# Patient Record
Sex: FEMALE | Race: WHITE | NOT HISPANIC OR LATINO
[De-identification: names, ages, dates, MRNs, and addresses within clinical notes are randomized per-mention and may not be internally consistent; named-entity substitution may affect disease eponyms.]

---

## 2020-11-20 ENCOUNTER — TRANSCRIPTION ENCOUNTER (OUTPATIENT)
Age: 50
End: 2020-11-20

## 2020-11-21 ENCOUNTER — OUTPATIENT (OUTPATIENT)
Dept: OUTPATIENT SERVICES | Facility: HOSPITAL | Age: 50
LOS: 1 days | Discharge: HOME | End: 2020-11-21
Payer: COMMERCIAL

## 2020-11-21 ENCOUNTER — RESULT REVIEW (OUTPATIENT)
Age: 50
End: 2020-11-21

## 2020-11-21 VITALS
HEIGHT: 64 IN | WEIGHT: 143.3 LBS | TEMPERATURE: 99 F | OXYGEN SATURATION: 96 % | DIASTOLIC BLOOD PRESSURE: 70 MMHG | SYSTOLIC BLOOD PRESSURE: 123 MMHG | HEART RATE: 77 BPM | RESPIRATION RATE: 16 BRPM

## 2020-11-21 VITALS
HEART RATE: 70 BPM | OXYGEN SATURATION: 97 % | RESPIRATION RATE: 16 BRPM | DIASTOLIC BLOOD PRESSURE: 60 MMHG | SYSTOLIC BLOOD PRESSURE: 110 MMHG

## 2020-11-21 DIAGNOSIS — Z98.890 OTHER SPECIFIED POSTPROCEDURAL STATES: Chronic | ICD-10-CM

## 2020-11-21 PROCEDURE — 88305 TISSUE EXAM BY PATHOLOGIST: CPT

## 2020-11-21 PROCEDURE — 88305 TISSUE EXAM BY PATHOLOGIST: CPT | Mod: 26

## 2020-11-21 PROCEDURE — 86850 RBC ANTIBODY SCREEN: CPT

## 2020-11-21 PROCEDURE — 86901 BLOOD TYPING SEROLOGIC RH(D): CPT

## 2020-11-21 PROCEDURE — 86900 BLOOD TYPING SEROLOGIC ABO: CPT

## 2020-11-21 PROCEDURE — 58558 HYSTEROSCOPY BIOPSY: CPT

## 2020-11-21 RX ORDER — ACETAMINOPHEN 500 MG
1000 TABLET ORAL ONCE
Refills: 0 | Status: DISCONTINUED | OUTPATIENT
Start: 2020-11-21 | End: 2020-11-21

## 2020-11-21 RX ORDER — ONDANSETRON 8 MG/1
8 TABLET, FILM COATED ORAL ONCE
Refills: 0 | Status: DISCONTINUED | OUTPATIENT
Start: 2020-11-21 | End: 2020-11-21

## 2020-11-21 RX ORDER — SIMETHICONE 80 MG/1
80 TABLET, CHEWABLE ORAL ONCE
Refills: 0 | Status: DISCONTINUED | OUTPATIENT
Start: 2020-11-21 | End: 2020-11-21

## 2020-11-21 RX ORDER — KETOROLAC TROMETHAMINE 30 MG/ML
15 SYRINGE (ML) INJECTION ONCE
Refills: 0 | Status: DISCONTINUED | OUTPATIENT
Start: 2020-11-21 | End: 2020-11-21

## 2020-11-21 RX ORDER — HYDROMORPHONE HYDROCHLORIDE 2 MG/ML
0.2 INJECTION INTRAMUSCULAR; INTRAVENOUS; SUBCUTANEOUS ONCE
Refills: 0 | Status: DISCONTINUED | OUTPATIENT
Start: 2020-11-21 | End: 2020-11-21

## 2020-11-21 RX ORDER — SODIUM CHLORIDE 9 MG/ML
1000 INJECTION, SOLUTION INTRAVENOUS
Refills: 0 | Status: DISCONTINUED | OUTPATIENT
Start: 2020-11-21 | End: 2020-11-21

## 2020-11-21 NOTE — ASU PATIENT PROFILE, ADULT - PMH
Genital herpes simplex, unspecified site    GERD (gastroesophageal reflux disease)    History of IBS    HPV in female    Hyperlipidemia, unspecified hyperlipidemia type    Hypothyroid    Migraines

## 2020-11-21 NOTE — PACU DISCHARGE NOTE - COMMENTS
discharged instructions provided, discharged via wheelchair to the lobby, escorted home by sister.
aching

## 2020-11-25 LAB — SURGICAL PATHOLOGY STUDY: SIGNIFICANT CHANGE UP

## 2021-10-22 PROBLEM — B97.7 PAPILLOMAVIRUS AS THE CAUSE OF DISEASES CLASSIFIED ELSEWHERE: Chronic | Status: ACTIVE | Noted: 2020-11-20

## 2021-10-22 PROBLEM — A60.00 HERPESVIRAL INFECTION OF UROGENITAL SYSTEM, UNSPECIFIED: Chronic | Status: ACTIVE | Noted: 2020-11-20

## 2021-10-22 PROBLEM — G43.909 MIGRAINE, UNSPECIFIED, NOT INTRACTABLE, WITHOUT STATUS MIGRAINOSUS: Chronic | Status: ACTIVE | Noted: 2020-11-20

## 2021-10-22 PROBLEM — K21.9 GASTRO-ESOPHAGEAL REFLUX DISEASE WITHOUT ESOPHAGITIS: Chronic | Status: ACTIVE | Noted: 2020-11-20

## 2021-10-22 PROBLEM — E78.5 HYPERLIPIDEMIA, UNSPECIFIED: Chronic | Status: ACTIVE | Noted: 2020-11-20

## 2021-10-22 PROBLEM — Z87.19 PERSONAL HISTORY OF OTHER DISEASES OF THE DIGESTIVE SYSTEM: Chronic | Status: ACTIVE | Noted: 2020-11-20

## 2021-10-22 PROBLEM — E03.9 HYPOTHYROIDISM, UNSPECIFIED: Chronic | Status: ACTIVE | Noted: 2020-11-20

## 2021-10-25 PROBLEM — Z00.00 ENCOUNTER FOR PREVENTIVE HEALTH EXAMINATION: Status: ACTIVE | Noted: 2021-10-25

## 2021-10-26 ENCOUNTER — TRANSCRIPTION ENCOUNTER (OUTPATIENT)
Age: 51
End: 2021-10-26

## 2021-10-26 ENCOUNTER — APPOINTMENT (OUTPATIENT)
Dept: UROLOGY | Facility: CLINIC | Age: 51
End: 2021-10-26
Payer: COMMERCIAL

## 2021-10-26 PROCEDURE — 99204 OFFICE O/P NEW MOD 45 MIN: CPT

## 2021-10-26 NOTE — HISTORY OF PRESENT ILLNESS
[FreeTextEntry1] : Language: English\par Date of First visit: 10/26/2021\par Accompanied by: Self\par Contact info: [phone]\par Referring Provider/PCP: Dr. Sarbjit Rehman\par Fax: [phone]\par \par Gynecologist : Nickie Dai\par [address]\par Fax: [phone]\par \par Cardiologist Dr. Kilgore\par \par \par CC/ Problem List:\par \par ===============================================================================\par FIRST VISIT:\par The patient is a 51 year female who first presents 10/26/2021 for UTI and urinary frequency.\par \par She had a UTI in the beginning of 2021. She thinks it may have been related to riding her Peloton. Her UTI treated with Macrobid. She was on this for 5 days. She got her period and then she had intercourse one week later. She had a UTI the next day and was treated with Macrobid for a day but a day later she then had fever (to 102) and UTI symptoms and back pain and pelvic pain. She was treated with Keflex by Urgent care. She is now on Keflex.\par \par She states today 10/26/2021 that she feels fine.\par \par She has never had a UTI before. She thinks she is perimenopausal. Her gyn felt she had vaginal dryness. She was put on Estrace cream around 10/19/2021. She gets frequent yeast infections: these are lifelong. She does not normally have constipation. She denies problems urinating normally. She is drinking a lot of water. She takes showers not baths. She does not swim regularly. She does not douche. She denies h/o  trauma or surgery. She has no h/o kidney stones. She has no h/o pediatric voiding dysfunction or UTI's\par \par -------------------------------------------------------------------------------------------\par INTERVAL VISITS:\par \par \par ===============================================================================\par \par PMH: Hypothyroid, HLD, small heart murmur, ? heart blockage, uterine polyp\par PSH: Polyp removal (uterine)\par POBH: (if applicable) \par FH: Father ALS\par \par ALL: Nutrasweet, Aspartame, Biaxin (n/v)\par MEDS: Keflex, Crestor, Synthroid, Florastor, Diflucan PRN, Vit D, Vit B12, Vit C, Zinc\par SOC:  Denies Tob, Social EtOH, Denies drugs\par \par \par ROS: Review of Systems is as per HPI unless otherwise denoted below\par \par \par ===============================================================================\par DATA: \par \par LABS:-------------------------------------------------------------------------------------------------------------------\par 10/26/2021: UA dip Trace BLD, Trace LE\par \par \par RADS:-------------------------------------------------------------------------------------------------------------------\par \par \par \par PATHOLOGY/CYTOLOGY:-------------------------------------------------------------------------------------------\par \par \par \par VOIDING STUDIES: ----------------------------------------------------------------------------------------------------\par 10/26/2021: PVR 2cc\par \par \par STONE STUDIES: (Analysis/LLSA)----------------------------------------------------------------------------------\par \par \par \par PROCEDURES: -----------------------------------------------------------------------------------------------\par \par \par \par \par ===============================================================================\par \par PHYSICAL EXAM:\par \par GEN: AAOx3, NAD, Habitus: normal\par \par BARRIERS to CARE: none\par \par PSYCH: Appropriate Behavior, Affect Congruent\par \par HEENT: AT/NC Trachea midline. EOMI.\par \par Lungs: No labored breathing.\par \par NEURO: + Movement, all 4 extremities grossly intact without deficits. No tremors.\par \par SKIN: Warm dry. No visible rashes or ulcers\par \par GAIT: Gait normal, Stability good\par \par =======================================================================================\par

## 2021-10-26 NOTE — ASSESSMENT
[FreeTextEntry1] : \par =======================================================================================\par ASSESSMENT and PLAN\par \par The patient is a 51 year female with a history of the following:\par \par 1. UTI that progressed into pyelonephritis:\par \par Greater than 50% of this 45 minute visit was spent discussing the causes, diagnosis, prevention and treatment of sporadic and recurrent UTI's in women. She understands that they often come from the patient's own vagina (especially when UTI's are post-coital) or own rectum (especially if the patient is fecally incontinent, constipated or has anal intercourse). \par \par The patient is adam-menopausal.\par The patient's UTI's can be related to intercourse.\par \par The patient and I discussed standard hygiene techniques for prevention. These include wiping front to back after having bowel movements. Voiding after intercourse remains controversial in its ability to help, but I recommend it. Patients who are constipated are at increased risk of UTI's.  We do not recommend the use of douches. Swimming, spinning (bicycling), intercourse, staying in wet bathing suits or wet pads can also trigger UTI's in certain patients. In premenopausal women, UTI's may be more common during certain times of the menstrual cycle. In addition the use of certain forms of birth control (e.g. condoms,  OCP, diaphragms, IUDs) or intravaginal appliances (e.g. pessaries) may also cause UTI's though this varies GREATLY from patient to patient.\par \par In 2019, the AUA issued their first guidelines for the management of recurrent UTI's in women however there is still a paucity of options.\par The patient understands that urine cultures are critical for the diagnosis and tracking of UTI's, especially with evolving and increasing antibiotic resistance. We do NOT treat asymptomatic bacteriuria and we do NOT test for cure. If she has symptoms of a UTI, she should contact our office for a urine culture order, otherwise, I recommend on weekends seeing an Urgent Care Center and requesting that a culture be sent to me. \par \par I try to avoid using antibiotic suppression as this causes resistance and can cause other adverse reactions. We also discussed the damage that antibiotics do to both the bowel and vaginal collin, and how this damage can cause diarrhea and vaginal yeast infections. This in turn can cause a cycle of UTI's.\par \par Unfortunately in many ways, we have not made many advances in the treatment of recurrent UTI's in women and our treatment/prevention options are limited.\par \par Today I have recommended the following:\par \par \par Prevention:\par -- Vaginal health probiotic orally: these can be found easily at pharmacy. I do not recommend for UTI's in particular is Florastor (Saccharomyces boulardii)\par -- Intravaginal vaginal probiotic. I recommended using a product like Florafemme once a week at bedtime or twice a week if on antibiotics\par \par --  I recommended that the patient stay well hydrated. Increased water intake in some studies can help prevent UTI's.\par --  Estrace or Premarin cream 1gm per vagina twice a week at bedtime. Please use this for three weeks then take one week off. Then begin the cycle again.\par \par She may want to do a UA C&S 1-2 weeks after being off of abx for her own anxiety levels. She realizes we do NOT normally test for cure. We explained the pathophysiology of UTI's at length as well as the causes and risk factors. She knows that anecdotally somethings like riding a bike and swimming can also increase risk. She was excited to try the above things. I told her to stay on Florastor while on Abx but I don't normally recommend Florastor when she is not on abx. \par \par We will do a phone visit in 6 mos.\par \par \par -----------------------------------------------------------------------------------------------------\par LABS/TESTS Ordered: \[facility] Meds Ordered: Florafemme, continue Estrace\[facility] Follow up: Phone visit 6 mos\par -----------------------------------------------------------------------------------------------------\par \par Greater than 50% of this 50 minute visit was spent counseling the patient and coordinating care.\par \par Thank you for allowing me to assist in the care of your patient. Should you have any questions please do not hesitate to reach out to me.\par \par \par Dinora Griffith MD\[facility] Associate \[facility] Department of Urology\[facility] Phone: [phone]\[facility] Fax: [phone]\[facility] \[facility] [address] [place]\[facility] [address]\[facility]

## 2022-01-28 ENCOUNTER — TRANSCRIPTION ENCOUNTER (OUTPATIENT)
Age: 52
End: 2022-01-28

## 2022-01-28 VITALS
SYSTOLIC BLOOD PRESSURE: 104 MMHG | OXYGEN SATURATION: 97 % | WEIGHT: 147.05 LBS | RESPIRATION RATE: 16 BRPM | TEMPERATURE: 99 F | HEART RATE: 70 BPM | DIASTOLIC BLOOD PRESSURE: 67 MMHG | HEIGHT: 64 IN

## 2022-01-28 RX ORDER — ESOMEPRAZOLE MAGNESIUM 40 MG/1
1 CAPSULE, DELAYED RELEASE ORAL
Qty: 0 | Refills: 0 | DISCHARGE

## 2022-01-28 NOTE — ASU PATIENT PROFILE, ADULT - NSICDXPASTMEDICALHX_GEN_ALL_CORE_FT
PAST MEDICAL HISTORY:  Genital herpes simplex, unspecified site     GERD (gastroesophageal reflux disease)     History of IBS     HPV in female     Hyperlipidemia, unspecified hyperlipidemia type     Hypothyroid     Hypothyroidism     Migraines

## 2022-01-28 NOTE — ASU PREOP CHECKLIST - SELECT TESTS ORDERED
BMP/CBC/CMP/PT/PTT/INR/Urinalysis/EKG/COVID-19 Andrea TRACY: Spoke to surgical resident- aware that Dr. Tovar's patient is in ED and being evaluated at this time. Andrea TRACY: Patient pending ultrasound, CT at this time; offered analgesics; defers at this time; will continue to re-eval. Andrea TRACY: CT, US performed; Dr. Tovar called and requests admission to his service with plan for cholecystectomy today; patient found to have uti- antibiotics ordered; patient made aware of plan.

## 2022-01-29 ENCOUNTER — OUTPATIENT (OUTPATIENT)
Dept: INPATIENT UNIT | Facility: HOSPITAL | Age: 52
LOS: 1 days | End: 2022-01-29
Payer: COMMERCIAL

## 2022-01-29 ENCOUNTER — TRANSCRIPTION ENCOUNTER (OUTPATIENT)
Age: 52
End: 2022-01-29

## 2022-01-29 ENCOUNTER — RESULT REVIEW (OUTPATIENT)
Age: 52
End: 2022-01-29

## 2022-01-29 VITALS
OXYGEN SATURATION: 98 % | HEART RATE: 72 BPM | SYSTOLIC BLOOD PRESSURE: 124 MMHG | RESPIRATION RATE: 16 BRPM | DIASTOLIC BLOOD PRESSURE: 56 MMHG

## 2022-01-29 DIAGNOSIS — Z98.890 OTHER SPECIFIED POSTPROCEDURAL STATES: Chronic | ICD-10-CM

## 2022-01-29 LAB
BLD GP AB SCN SERPL QL: NEGATIVE — SIGNIFICANT CHANGE UP
RH IG SCN BLD-IMP: POSITIVE — SIGNIFICANT CHANGE UP

## 2022-01-29 PROCEDURE — 88305 TISSUE EXAM BY PATHOLOGIST: CPT | Mod: 26

## 2022-01-29 PROCEDURE — 58558 HYSTEROSCOPY BIOPSY: CPT

## 2022-01-29 PROCEDURE — 86900 BLOOD TYPING SEROLOGIC ABO: CPT

## 2022-01-29 PROCEDURE — 86850 RBC ANTIBODY SCREEN: CPT

## 2022-01-29 PROCEDURE — 86901 BLOOD TYPING SEROLOGIC RH(D): CPT

## 2022-01-29 PROCEDURE — 88305 TISSUE EXAM BY PATHOLOGIST: CPT

## 2022-01-29 DEVICE — MYOSURE TISSUE REMOVAL DEVICE REACH: Type: IMPLANTABLE DEVICE | Status: FUNCTIONAL

## 2022-01-29 RX ORDER — METOCLOPRAMIDE HCL 10 MG
10 TABLET ORAL EVERY 8 HOURS
Refills: 0 | Status: DISCONTINUED | OUTPATIENT
Start: 2022-01-29 | End: 2022-01-29

## 2022-01-29 RX ORDER — OXYCODONE HYDROCHLORIDE 5 MG/1
10 TABLET ORAL EVERY 4 HOURS
Refills: 0 | Status: DISCONTINUED | OUTPATIENT
Start: 2022-01-29 | End: 2022-01-29

## 2022-01-29 RX ORDER — ROSUVASTATIN CALCIUM 5 MG/1
1 TABLET ORAL
Qty: 0 | Refills: 0 | DISCHARGE

## 2022-01-29 RX ORDER — SODIUM CHLORIDE 9 MG/ML
1000 INJECTION, SOLUTION INTRAVENOUS
Refills: 0 | Status: DISCONTINUED | OUTPATIENT
Start: 2022-01-29 | End: 2022-01-29

## 2022-01-29 RX ORDER — ACETAMINOPHEN 500 MG
1000 TABLET ORAL EVERY 6 HOURS
Refills: 0 | Status: DISCONTINUED | OUTPATIENT
Start: 2022-01-29 | End: 2022-01-29

## 2022-01-29 RX ORDER — SIMETHICONE 80 MG/1
80 TABLET, CHEWABLE ORAL EVERY 6 HOURS
Refills: 0 | Status: DISCONTINUED | OUTPATIENT
Start: 2022-01-29 | End: 2022-01-29

## 2022-01-29 RX ORDER — LEVOTHYROXINE SODIUM 125 MCG
1 TABLET ORAL
Qty: 0 | Refills: 0 | DISCHARGE

## 2022-01-29 RX ORDER — ONDANSETRON 8 MG/1
8 TABLET, FILM COATED ORAL EVERY 8 HOURS
Refills: 0 | Status: DISCONTINUED | OUTPATIENT
Start: 2022-01-29 | End: 2022-01-29

## 2022-01-29 RX ORDER — KETOROLAC TROMETHAMINE 30 MG/ML
30 SYRINGE (ML) INJECTION EVERY 8 HOURS
Refills: 0 | Status: DISCONTINUED | OUTPATIENT
Start: 2022-01-29 | End: 2022-01-29

## 2022-01-29 RX ORDER — NORETHINDRONE AND ETHINYL ESTRADIOL 0.4-0.035
1 KIT ORAL
Qty: 0 | Refills: 0 | DISCHARGE

## 2022-01-29 RX ORDER — OXYCODONE HYDROCHLORIDE 5 MG/1
5 TABLET ORAL EVERY 4 HOURS
Refills: 0 | Status: DISCONTINUED | OUTPATIENT
Start: 2022-01-29 | End: 2022-01-29

## 2022-01-29 RX ORDER — HYDROMORPHONE HYDROCHLORIDE 2 MG/ML
0.2 INJECTION INTRAMUSCULAR; INTRAVENOUS; SUBCUTANEOUS
Refills: 0 | Status: DISCONTINUED | OUTPATIENT
Start: 2022-01-29 | End: 2022-01-29

## 2022-01-29 NOTE — ASU DISCHARGE PLAN (ADULT/PEDIATRIC) - CALL YOUR DOCTOR IF YOU HAVE ANY OF THE FOLLOWING:
100.4/Bleeding that does not stop/Pain not relieved by Medications/Fever greater than (need to indicate Fahrenheit or Celsius)

## 2022-01-29 NOTE — ASU DISCHARGE PLAN (ADULT/PEDIATRIC) - NS MD DC FALL RISK RISK
For information on Fall & Injury Prevention, visit: [email or web address] OR  [email or web address] OR  https://www.cdc.gov/steadi/patient.html

## 2022-01-29 NOTE — DISCHARGE NOTE NURSING/CASE MANAGEMENT/SOCIAL WORK - PATIENT PORTAL LINK FT
You can access the FollowMyHealth Patient Portal offered by [facility] by registering at the following website: [email or web address]. By joining [facility]’s FollowMyHealth portal, you will also be able to view your health information using other applications (apps) compatible with our system.

## 2022-01-29 NOTE — ASU DISCHARGE PLAN (ADULT/PEDIATRIC) - CARE PROVIDER_API CALL
Hai Clark)  Obstetrics and Gynecology  [address], [address]  [place], [place] [address]  Phone: [phone]  Fax: [phone]  Follow Up Time:

## 2022-01-29 NOTE — DISCHARGE NOTE NURSING/CASE MANAGEMENT/SOCIAL WORK - NSDCPEFALRISK_GEN_ALL_CORE
For information on Fall & Injury Prevention, visit: [email or web address] OR  [email or web address] OR  https://www.cdc.gov/steadi/patient.html

## 2022-01-29 NOTE — PACU DISCHARGE NOTE - COMMENTS
discharge instruction reviewed in-full with pt, good understanding verbalized pt met criteria cor d/c home condition stable.

## 2022-02-01 LAB — SURGICAL PATHOLOGY STUDY: SIGNIFICANT CHANGE UP

## 2022-04-12 ENCOUNTER — APPOINTMENT (OUTPATIENT)
Dept: UROLOGY | Facility: CLINIC | Age: 52
End: 2022-04-12
Payer: COMMERCIAL

## 2022-04-12 PROCEDURE — 99442: CPT

## 2022-06-22 PROBLEM — E03.9 HYPOTHYROIDISM, UNSPECIFIED: Chronic | Status: ACTIVE | Noted: 2022-01-28

## 2022-06-23 ENCOUNTER — APPOINTMENT (OUTPATIENT)
Dept: UROLOGY | Facility: CLINIC | Age: 52
End: 2022-06-23
Payer: COMMERCIAL

## 2022-06-23 VITALS
SYSTOLIC BLOOD PRESSURE: 126 MMHG | WEIGHT: 145 LBS | OXYGEN SATURATION: 97 % | BODY MASS INDEX: 24.45 KG/M2 | TEMPERATURE: 97.2 F | HEIGHT: 64.5 IN | DIASTOLIC BLOOD PRESSURE: 84 MMHG | HEART RATE: 79 BPM

## 2022-06-23 DIAGNOSIS — R31.29 OTHER MICROSCOPIC HEMATURIA: ICD-10-CM

## 2022-06-23 DIAGNOSIS — M54.9 DORSALGIA, UNSPECIFIED: ICD-10-CM

## 2022-06-23 PROCEDURE — 81003 URINALYSIS AUTO W/O SCOPE: CPT | Mod: QW

## 2022-06-23 PROCEDURE — 99214 OFFICE O/P EST MOD 30 MIN: CPT

## 2022-06-24 LAB
APPEARANCE: CLEAR
BACTERIA: NEGATIVE
BILIRUB UR QL STRIP: NEGATIVE
BILIRUBIN URINE: NEGATIVE
BLOOD URINE: NORMAL
COLLECTION METHOD: NORMAL
COLOR: YELLOW
GLUCOSE QUALITATIVE U: NEGATIVE
GLUCOSE UR-MCNC: NEGATIVE
HCG UR QL: 0.2 EU/DL
HGB UR QL STRIP.AUTO: NORMAL
HYALINE CASTS: 1 /LPF
KETONES UR-MCNC: NEGATIVE
KETONES URINE: NEGATIVE
LEUKOCYTE ESTERASE UR QL STRIP: NEGATIVE
LEUKOCYTE ESTERASE URINE: NEGATIVE
MICROSCOPIC-UA: NORMAL
NITRITE UR QL STRIP: NEGATIVE
NITRITE URINE: NEGATIVE
PH UR STRIP: 6
PH URINE: 6
PROT UR STRIP-MCNC: NEGATIVE
PROTEIN URINE: NEGATIVE
RED BLOOD CELLS URINE: 3 /HPF
SP GR UR STRIP: 1.02
SPECIFIC GRAVITY URINE: 1.02
SQUAMOUS EPITHELIAL CELLS: 1 /HPF
UROBILINOGEN URINE: NORMAL
WHITE BLOOD CELLS URINE: 1 /HPF

## 2022-06-27 ENCOUNTER — APPOINTMENT (OUTPATIENT)
Dept: UROLOGY | Facility: CLINIC | Age: 52
End: 2022-06-27
Payer: COMMERCIAL

## 2022-06-27 LAB — BACTERIA UR CULT: NORMAL

## 2022-06-27 PROCEDURE — 99443: CPT

## 2022-06-27 NOTE — HISTORY OF PRESENT ILLNESS
[FreeTextEntry1] : Language: English\par Date of First visit: 10/26/2021\par Accompanied by: Self\par Contact info: [phone]\par Referring Provider/PCP: Dr. Sarbjit Rehman\par Fax: [phone]\par \par Gynecologist : Nickie Dai\par [address]\par Fax: [phone]\par \par Cardiologist Dr. Kilgore\par \par \par CC/ Problem List:\par \par ===============================================================================\par FIRST VISIT:\par The patient was a 51 year female who first presented on 10/26/2021 for UTI and urinary frequency.\par \par She had a UTI in the beginning of 2021. She thinks it may have been related to riding her Peloton. Her UTI treated with Macrobid. She was on this for 5 days. She got her period and then she had intercourse one week later. She had a UTI the next day and was treated with Macrobid for a day but a day later she then had fever (to 102) and UTI symptoms and back pain and pelvic pain. She was treated with Keflex by Urgent care. She is now on Keflex.\par \par She has never had a UTI before. She thinks she is perimenopausal. Her gyn felt she had vaginal dryness. She was put on Estrace cream around 10/19/2021. She gets frequent yeast infections: these are lifelong. She does not normally have constipation. She denies problems urinating normally. She is drinking a lot of water. She takes showers not baths. She does not swim regularly. She does not douche. She denies h/o  trauma or surgery. She has no h/o kidney stones. She has no h/o pediatric voiding dysfunction or UTI's\[facility] [place] \par -------------------------------------------------------------------------------------------\par INTERVAL VISITS:\par \par She is not taking the Estrace because she had a "Growth". She was put on Junel to help prevent these. She is using the Florafemme and has not had a UTI since her visit in Oct 2021.\par \par The patient's age today 2022 is 51 year old.\par Please note interval events and changes in PMH, PSH, MEDS and ALLERGIES were reviewed.\par She has BL SI joint pain R>L. Her cardiologist told her her urine was not right. She has no fever. She denies dysuria. She has mild urgency. She has a little more frequency but she ahs been drinking "water like a monster". She is extremely anxious about these results. she is still using Florafemme. Her dog has ketones and protein in her urine and has liver and kidney disease which makes her nervous.\par \par ===============================================================================\par \par PMH: Hypothyroid, HLD, small heart murmur, ? heart blockage, uterine polyp\par PSH: Polyp removal (uterine)\par POBH: (if applicable) \par FH: Father ALS\par \par ALL: Nutrasweet, Aspartame, Biaxin (n/v)\par MEDS: Keflex, Crestor, Synthroid, Florastor, Diflucan PRN, Vit D, Vit B12, Vit C, Zinc, Junel, Florafemme\par SOC:  Denies Tob, Social EtOH, Denies drugs\par \par \par ROS: Review of Systems is as per HPI unless otherwise denoted below\par \par \par ===============================================================================\par DATA: \par \par LABS:-------------------------------------------------------------------------------------------------------------------\par 10/26/2021: UA dip Trace BLD, Trace LE\par 2022: sCre 0.82, \par 2022: UA micro 1 WBC, Trace Ketones, Negative blood, Neg Nit, Trace LE, 13 RBC/hpf, 2 Epi, Few Bacteria\par 2022: UA dip small blood, Neg NIT, Neg LE\par \par \par RADS:-------------------------------------------------------------------------------------------------------------------\par \par \par \par PATHOLOGY/CYTOLOGY:-------------------------------------------------------------------------------------------\par \par \par \par VOIDING STUDIES: ----------------------------------------------------------------------------------------------------\par 10/26/2021: PVR 2cc\par \par \par STONE STUDIES: (Analysis/LLSA)----------------------------------------------------------------------------------\par \par \par \par PROCEDURES: -----------------------------------------------------------------------------------------------\par \par \par \par \par ===============================================================================\par \par PHYSICAL EXAM:\par \par GEN: AAOx3, NAD, Habitus: normal\par \par BARRIERS to CARE: none\par \par PSYCH: Appropriate Behavior, Affect Congruent\par \par HEENT: AT/NC Trachea midline. EOMI.\par \par Lungs: No labored breathing.\par \par NEURO: + Movement, all 4 extremities grossly intact without deficits. No tremors.\par \par SKIN: Warm dry. No visible rashes or ulcers\par \par GAIT: Gait normal, Stability good\par \par \par =======================================================================================\par \par ASSESSMENT and PLAN\par \par Today 2022 the patient is a 51 year old female with a history of the following:\par \par \par 1. UTI that progressed into pyelonephritis:\par \par Greater than 50% of this 45 minute visit was spent discussing the causes, diagnosis, prevention and treatment of sporadic and recurrent UTI's in women. She understands that they often come from the patient's own vagina (especially when UTI's are post-coital) or own rectum (especially if the patient is fecally incontinent, constipated or has anal intercourse). \par \par The patient is adam-menopausal.\par The patient's UTI's can be related to intercourse.\par \par She stopped the Estrace and is on Junel for her uterine polyps. \par She is having good success with Florafemme. She understands that prevention is critical to avoid antibiotic use and UTI's. \par \par \par \par 2. SI joint pain with largely negative urine: she is anxious that she has a UTI\par We will send a urine culture. If it is negative we will do a hematuria workup. If it is positive we will treat her.\par I tried at length to educate her about ketones, nit, LE, protein in the urine to allay her fears. \par \par She will go to the ER/Urgent care if she gets worse.\par \par -----------------------------------------------------------------------------------------------------\par LABS/TESTS Ordered: [facility] C&S\par Meds Ordered: [place]\[facility] [place] Follow up: Video visit on Monday\par -----------------------------------------------------------------------------------------------------\par \par Greater than 50% of this 30 minute visit was spent counseling the patient and coordinating care.\par \par Thank you for allowing me to assist in the care of your patient. Should you have any questions please do not hesitate to reach out to me.\par \par \par Dinora Griffith MD\par Associate \[facility] Department of Urology\[facility] Phone: [phone]\[facility] Fax: [phone]\[facility] \[facility] [address] [place] [address] [address]

## 2022-07-01 NOTE — HISTORY OF PRESENT ILLNESS
[FreeTextEntry1] : Language: English\par Date of First visit: 10/26/2021\par Accompanied by: Self\par Contact info: [phone]\par Referring Provider/PCP: Dr. Sarbjit Rehman\par Fax: [phone]\par \par Gynecologist : Nickie Dai\par [address]\par Fax: [phone]\par \par Cardiologist Dr. Kilgore\par \par \par CC/ Problem List:\par \par ===============================================================================\par FIRST VISIT:\par The patient was a 51 year female who first presented on 10/26/2021 for UTI and urinary frequency.\par \par She had a UTI in the beginning of 2021. She thinks it may have been related to riding her Peloton. Her UTI treated with Macrobid. She was on this for 5 days. She got her period and then she had intercourse one week later. She had a UTI the next day and was treated with Macrobid for a day but a day later she then had fever (to 102) and UTI symptoms and back pain and pelvic pain. She was treated with Keflex by Urgent care. She is now on Keflex.\par \par She has never had a UTI before. She thinks she is perimenopausal. Her gyn felt she had vaginal dryness. She was put on Estrace cream around 10/19/2021. She gets frequent yeast infections: these are lifelong. She does not normally have constipation. She denies problems urinating normally. She is drinking a lot of water. She takes showers not baths. She does not swim regularly. She does not douche. She denies h/o  trauma or surgery. She has no h/o kidney stones. She has no h/o pediatric voiding dysfunction or UTI's\[facility] \par -------------------------------------------------------------------------------------------\par INTERVAL VISITS:\par \par She is not taking the Estrace because she had a "Growth". She was put on Junel to help prevent these. She is using the Florafemme and has not had a UTI since her visit in Oct 2021.\par \par She re-presented in 2022 with BL SI joint pain R>L. Her cardiologist told her her urine was not right. She did not have fever or dysuria but did have slightly more frequency and urgency but was drinking a ton of water. She was very anxious. \par \par The patient's age today 2022 is 51 year old.\par Please note interval events and changes in PMH, PSH, MEDS and ALLERGIES were reviewed.\par we are reviewing her results. She feels the same but now feels more pelvic pain and pain in her side.\par \par ===============================================================================\par \par PMH: Hypothyroid, HLD, small heart murmur, ? heart blockage, uterine polyp\par PSH: Polyp removal (uterine)\par POBH: (if applicable) \par FH: Father ALS\par \par ALL: Nutrasweet, Aspartame, Biaxin (n/v)\par MEDS: Keflex, Crestor, Synthroid, Florastor, Diflucan PRN, Vit D, Vit B12, Vit C, Zinc, Junel, Florafemme\par SOC:  Denies Tob, Social EtOH, Denies drugs\par \par \par ROS: Review of Systems is as per HPI unless otherwise denoted below\par \par \par ===============================================================================\par DATA: \par \par LABS:-------------------------------------------------------------------------------------------------------------------\par 10/26/2021: UA dip Trace BLD, Trace LE\par 2022: sCre 0.82, \par 2022: UA micro 1 WBC, Trace Ketones, Negative blood, Neg Nit, Trace LE, 13 RBC/hpf, 2 Epi, Few Bacteria\par 2022: UA dip small blood, Neg NIT, Neg LE\par 2022: UA negative NIT, LE, 3 RBC,  1 Epi, 1 WBC; UCx negative\par \par RADS:-------------------------------------------------------------------------------------------------------------------\par \par \par \par PATHOLOGY/CYTOLOGY:-------------------------------------------------------------------------------------------\par \par \par \par VOIDING STUDIES: ----------------------------------------------------------------------------------------------------\par 10/26/2021: PVR 2cc\par \par \par STONE STUDIES: (Analysis/LLSA)----------------------------------------------------------------------------------\par \par \par \par PROCEDURES: -----------------------------------------------------------------------------------------------\par \par \par \par \par ===============================================================================\par \par PHYSICAL EXAM:\par \par GEN: AAOx3, NAD, Habitus: normal\par \par BARRIERS to CARE: none\par \par based on this video visit\par \par =======================================================================================\par \par ASSESSMENT and PLAN\par \par Today 2022 the patient is a 51 year old female with a history of the following:\par \par 1. UTI that progressed into pyelonephritis:\par She is using florafemme and has been doing well.\par \par \par 2. SI joint pain with largely negative urine: she is anxious that she has a UTI\par She did NOT have an infection but did have 3-13RBC/hpf\par \par ************************************** MICROHEMATURIA GUIDELINES**************************************************\par \par Hematuria RISK FACTORS-----------------------------------------------------------------------------------------------------\par Age, Gender, Smoking, Chronic foreign body/catheter, occupational exposure, \par Prior Cyclophosphamide or ifosfomide, prior RT, irritative voiding symptoms\par \par RISK STRATIFICATION---------------------------------------------------------------------------------------------------------\par \par -.-.-.-.-.-.-.-.-.-.-.Low Risk-.-.-.-.-.-.-.-.-.-.-.-.-.Mod Risk-.-.-.-.-.-.-.-.-.-.-.-.-.-.High Risk-.-.-.-.-.-.-.-.-.-.-.-.-.-.-.-.-.-.-\par (ALL OF THE BELOW)                    (ANY ONE OF THE BELOW)         (ANY OF THE BELOW or MORE)\par --Females <51yo                                   --Female 50-60yo                      --Females >59yo\par -- Males <41yo                                      --Males 40-60yo                        --Males >59yo\par 0-10 pack years                                   10-30 pack years                       >30 pack years\par 3-10 RBC/hpf                                         11-25 RBC/hpf                          >25 RBC/hpf\par No RISK FACTORS                               ANY RISK FACTORS                 \par                                                               Low risk w/ 3-10 RBC                 Gross hematuria\par                                                                     on repeat UA\par \par \par WORKUP-------------------------------------------------------------------------------------------------------------------------\par -.-.-.-.-.-.-.-.-.-.-.Low Risk-.-.-.-.-.-.-.-.-.-.-.-.-.Mod Risk-.-.-.-.-.-.-.-.-.-.-.-.-.-.High Risk-.-.-.-.-.-.-.-.-.-.-.-.-.-.-.-.-.-.-\par --Initial: Repeat UA 6 mos                           Cystoscopy                              Cystoscopy\par             OR                                                        AND                                         AND\par    Cysto, Renal sono                                   Renal Sono                                    CTU OR\par                                                                                                               2nd Choice: MRU\par                                                                                                               3rd Choice: RTGP w/ NCCT or Sono\par \par **In patients with microhematuria with a known FH of RCC or genetic renal tumor syndrome, \par upper tract imaging should be performed regardless of risk stratification\par \par \par -----------------------------------------------------------------------------------------------------\par LABS/TESTS Ordered: RBUS\par Meds Ordered: Florafemme\par Follow up: Cysto after RBUS\par -----------------------------------------------------------------------------------------------------\par \par This visit was held as a VIDEO visit on a HIPAA compliant video platform.\par \par Greater than 50% of this 20 minute visit was spent counseling the patient and coordinating care.\par \par Thank you for allowing me to assist in the care of your patient. Should you have any questions please do not hesitate to reach out to me.\par \par \par Dinora Griffith MD\par Associate \par Department of Urology\par [facility]\par Phone: [phone]\par Fax: [phone]\par \par [address]\par [facility] [address]

## 2022-07-07 ENCOUNTER — APPOINTMENT (OUTPATIENT)
Dept: UROLOGY | Facility: CLINIC | Age: 52
End: 2022-07-07

## 2022-07-07 VITALS
TEMPERATURE: 98.1 F | HEART RATE: 78 BPM | OXYGEN SATURATION: 97 % | SYSTOLIC BLOOD PRESSURE: 117 MMHG | DIASTOLIC BLOOD PRESSURE: 80 MMHG

## 2022-07-07 LAB
BILIRUB UR QL STRIP: NORMAL
CLARITY UR: CLEAR
COLLECTION METHOD: NORMAL
GLUCOSE UR-MCNC: NORMAL
HCG UR QL: 0.2 EU/DL
HGB UR QL STRIP.AUTO: NORMAL
KETONES UR-MCNC: NORMAL
LEUKOCYTE ESTERASE UR QL STRIP: NORMAL
NITRITE UR QL STRIP: NORMAL
PH UR STRIP: 5
PROT UR STRIP-MCNC: NORMAL
SP GR UR STRIP: >=1.03

## 2022-07-07 PROCEDURE — 81003 URINALYSIS AUTO W/O SCOPE: CPT | Mod: QW

## 2022-07-07 PROCEDURE — 52000 CYSTOURETHROSCOPY: CPT

## 2022-11-17 ENCOUNTER — APPOINTMENT (OUTPATIENT)
Dept: UROLOGY | Facility: CLINIC | Age: 52
End: 2022-11-17

## 2022-11-17 VITALS
DIASTOLIC BLOOD PRESSURE: 81 MMHG | OXYGEN SATURATION: 96 % | HEART RATE: 77 BPM | TEMPERATURE: 98.3 F | SYSTOLIC BLOOD PRESSURE: 125 MMHG

## 2022-11-17 PROCEDURE — 99213 OFFICE O/P EST LOW 20 MIN: CPT

## 2023-03-28 DIAGNOSIS — R10.2 PELVIC AND PERINEAL PAIN: ICD-10-CM

## 2023-03-30 LAB — BACTERIA UR CULT: NORMAL

## 2023-03-30 NOTE — HISTORY OF PRESENT ILLNESS
[FreeTextEntry1] : Language: English\par Date of First visit: 10/26/2021\par Accompanied by: Self\par Contact info: [phone]\par Referring Provider/PCP: Dr. Sarbjit Rehman\par Fax: [phone]\par \par Gynecologist : Nickie Dai\par [address]\par Fax: [phone]\par \par Cardiologist Dr. Kilgore\par \par \par CC/ Problem List:\par \par ===============================================================================\par FIRST VISIT:\par The patient was a 51 year female who first presented on 10/26/2021 for UTI and urinary frequency.\par \par She had a UTI in the beginning of 2021. She thinks it may have been related to riding her Peloton. Her UTI treated with Macrobid. She was on this for 5 days. She got her period and then she had intercourse one week later. She had a UTI the next day and was treated with Macrobid for a day but a day later she then had fever (to 102) and UTI symptoms and back pain and pelvic pain. She was treated with Keflex by Urgent care. She is now on Keflex.\par \par She has never had a UTI before. She thinks she is perimenopausal. Her gyn felt she had vaginal dryness. She was put on Estrace cream around 10/19/2021. She gets frequent yeast infections: these are lifelong. She does not normally have constipation. She denies problems urinating normally. She is drinking a lot of water. She takes showers not baths. She does not swim regularly. She does not douche. She denies h/o  trauma or surgery. She has no h/o kidney stones. She has no h/o pediatric voiding dysfunction or UTI's\[facility] \par -------------------------------------------------------------------------------------------\par INTERVAL VISITS:\par \par She is not taking the Estrace because she had a "Growth". She was put on Junel to help prevent these. She is using the Florafemme and has not had a UTI since her visit in Oct 2021.\par \par She re-presented in 2022 with BL SI joint pain R>L. Her cardiologist told her her urine was not right. She did not have fever or dysuria but did have slightly more frequency and urgency but was drinking a ton of water. She was very anxious. She was convinced she had a UTI even though her urine was largely normal and positive only for hematuria. (Her culture was negative)\par \par Because she had microhematuria (int risk) we had her do a RBUS and on 2022 we did a cystoscopy that showed mild trigonitis.\par \par The patient's age today 2022 is 52 year old.\par Please note interval events and changes in PMH, PSH, MEDS and ALLERGIES were reviewed.\par She is super worried because she had trace protein (20) and hyaline casts on her urine. Her dog  of renal failure and her mom had DMII and kidney issues. She is under a lot of stress. \par \par \par ===============================================================================\par \par PMH: Hypothyroid, HLD, small heart murmur, ? heart blockage, uterine polyp\par PSH: Polyp removal (uterine)\par POBH: (if applicable) \par FH: Father ALS; had MI in his 40's; mom has DMII\par \par ALL: Nutrasweet, Aspartame, Biaxin (n/v)\par MEDS: Keflex, Crestor, Synthroid, Florastor, Diflucan PRN, Vit D, Vit B12, Vit C, Zinc, Junel, Florafemme\par SOC:  Denies Tob, Social EtOH, Denies drugs\par \par \par ROS: Review of Systems is as per HPI unless otherwise denoted below\par \par \par ===============================================================================\par DATA: \par \par LABS:-------------------------------------------------------------------------------------------------------------------\par 10/26/2021: UA dip Trace BLD, Trace LE\par 2022: sCre 0.82, \par 2022: UA micro 1 WBC, Trace Ketones, Negative blood, Neg Nit, Trace LE, 13 RBC/hpf, 2 Epi, Few Bacteria\par 2022: UA dip small blood, Neg NIT, Neg LE\par 2022: UA negative NIT, LE, 3 RBC,  1 Epi, 1 WBC; UCx negative\par \par \par RADS:-------------------------------------------------------------------------------------------------------------------\par 2022: Normal RBUS\par \par \par PATHOLOGY/CYTOLOGY:-------------------------------------------------------------------------------------------\par \par \par \par VOIDING STUDIES: ----------------------------------------------------------------------------------------------------\par 10/26/2021: PVR 2cc\par \par \par STONE STUDIES: (Analysis/LLSA)----------------------------------------------------------------------------------\par \par \par \par PROCEDURES: -----------------------------------------------------------------------------------------------\par 2022: Cystoscopy\par Mild trigonitis otherwise normal\par \par \par \par ===============================================================================\par \par \par PHYSICAL EXAM:\par \par GEN: AAOx3, NAD, Habitus: normal\par \par BARRIERS to CARE: none\par \par PSYCH: Appropriate Behavior, Affect Congruent\par \par HEENT: AT/NC Trachea midline. EOMI.\par \par Lungs: No labored breathing.\par \par NEURO: + Movement, all 4 extremities grossly intact without deficits. No tremors.\par \par SKIN: Warm dry. No visible rashes or ulcers\par \par GAIT: Gait good, Stability normal\par \par \par =======================================================================================\par \par ASSESSMENT and PLAN\par \par Today 2022 the patient is a 52 year old female with a history of the following:\par \par \par \par 1. UTI that progressed into pyelonephritis:\par She is using florafemme and has been doing well.\par \par \par 2. SI joint pain with largely negative urine: she is anxious that she has a UTI\par She did NOT have an infection but did have 3-13RBC/hpf. Her RBUS was normal. Her culture was normal.\par Her cystoscopy 2022 was normal except for mild trigonitis.\par She will return in 2023 for repeat UA\par \par 3. 20 protein and hyaline casts in her urine:\par I explained that these are likely spurious and inconsequential. I am more worried about her stress/anxiety levels which can affect her health. To make this more concrete we discussed the impact cortisol can have on health including adiposity and blood sugar among other things. She is very good at being goal directed but has not tackled her stress levels I suspect because it is less concrete. She understood and agreed. \par She will try meditation and working on her stress/anxiety because these things can impact her health. She understood this and we had a long discussion about this.\par \par \par -----------------------------------------------------------------------------------------------------\par LABS/TESTS Ordered:  Manage stress!\par Meds Ordered: Florafemme\par Follow up: 2023\par -----------------------------------------------------------------------------------------------------\par \par \par The total amount of time I have personally spent preparing for this visit, reviewing the patient's test results, obtaining external history, ordering tests/medications, documenting clinical information, communicating with and counseling the patient/family and/or caregiver(s), and spent face to face with the patient explaining the above was  25 minutes.\par \par \par Thank you for allowing me to assist in the care of your patient. Should you have any questions please do not hesitate to reach out to me.\par \par \par Dinora Griffith MD\par Associate \[facility] Department of Urology\[facility] Phone: [phone]\[facility] Fax: [phone]\[facility] \[address] [address]

## 2023-03-30 NOTE — ADDENDUM
[FreeTextEntry1] : 3/28/2023: URine culture negative\par \par Pt came in on 3/28/2023 for genital pain after intercourse. She was going to [place] for vacation and was worried. we did not give her abx due to low index of suspicion. Culture returned negative. Sent results via Dox text.

## 2023-07-10 ENCOUNTER — APPOINTMENT (OUTPATIENT)
Dept: UROLOGY | Facility: CLINIC | Age: 53
End: 2023-07-10
Payer: COMMERCIAL

## 2023-07-10 VITALS
DIASTOLIC BLOOD PRESSURE: 81 MMHG | HEART RATE: 92 BPM | OXYGEN SATURATION: 96 % | SYSTOLIC BLOOD PRESSURE: 119 MMHG | TEMPERATURE: 97.5 F

## 2023-07-10 LAB
BILIRUB UR QL STRIP: NORMAL
CLARITY UR: CLEAR
COLLECTION METHOD: NORMAL
GLUCOSE UR-MCNC: NORMAL
HCG UR QL: 0.2 EU/DL
HGB UR QL STRIP.AUTO: NORMAL
KETONES UR-MCNC: NORMAL
LEUKOCYTE ESTERASE UR QL STRIP: NORMAL
NITRITE UR QL STRIP: NORMAL
PH UR STRIP: 5
PROT UR STRIP-MCNC: NORMAL
SP GR UR STRIP: 1.03

## 2023-07-10 PROCEDURE — 81003 URINALYSIS AUTO W/O SCOPE: CPT | Mod: QW

## 2023-07-10 PROCEDURE — 99214 OFFICE O/P EST MOD 30 MIN: CPT | Mod: 25

## 2023-07-12 LAB
APPEARANCE: CLEAR
BACTERIA: NEGATIVE /HPF
BILIRUBIN URINE: NEGATIVE
BLOOD URINE: ABNORMAL
CAST: 0 /LPF
COLOR: YELLOW
EPITHELIAL CELLS: 1 /HPF
GLUCOSE QUALITATIVE U: NEGATIVE MG/DL
KETONES URINE: NEGATIVE MG/DL
LEUKOCYTE ESTERASE URINE: NEGATIVE
MICROSCOPIC-UA: NORMAL
NITRITE URINE: NEGATIVE
PH URINE: 5.5
PROTEIN URINE: NEGATIVE MG/DL
RED BLOOD CELLS URINE: 1 /HPF
SPECIFIC GRAVITY URINE: 1.02
UROBILINOGEN URINE: 0.2 MG/DL
WHITE BLOOD CELLS URINE: 1 /HPF

## 2023-07-18 NOTE — ADDENDUM
[FreeTextEntry1] : 7/10/2023: UA trace blood, neg NIT/LE, 1 WBC, 1 RBC, 1 Epi\par \par Records received\par 6/23/2023: UA dip 1+ blood, 0-5 WBC, 0-2 RBC, 0-5 Epi, Few bacteria

## 2023-07-18 NOTE — HISTORY OF PRESENT ILLNESS
[FreeTextEntry1] : Language: English\par Date of First visit: 10/26/2021\par Accompanied by: Self\par Contact info: [phone]\par Referring Provider/PCP: Dr. Sarbjit Rehman\par Fax: [phone]\par \par Gynecologist : Nickie Dai\par [address]\par Fax: [phone]\par \par Cardiologist Dr. Kilgore\par \par \par CC/ Problem List:\par \par ===============================================================================\par FIRST VISIT:\par The patient was a 51 year female who first presented on 10/26/2021 for UTI and urinary frequency.\par \par She had a UTI in the beginning of 2021. She thinks it may have been related to riding her Peloton. Her UTI treated with Macrobid. She was on this for 5 days. She got her period and then she had intercourse one week later. She had a UTI the next day and was treated with Macrobid for a day but a day later she then had fever (to 102) and UTI symptoms and back pain and pelvic pain. She was treated with Keflex by Urgent care. She is now on Keflex.\par \par She has never had a UTI before. She thinks she is perimenopausal. Her gyn felt she had vaginal dryness. She was put on Estrace cream around 10/19/2021. She gets frequent yeast infections: these are lifelong. She does not normally have constipation. She denies problems urinating normally. She is drinking a lot of water. She takes showers not baths. She does not swim regularly. She does not douche. She denies h/o  trauma or surgery. She has no h/o kidney stones. She has no h/o pediatric voiding dysfunction or UTI's\[facility] \par -------------------------------------------------------------------------------------------\par INTERVAL VISITS:\par \par She is not taking the Estrace because she had a "Growth". She was put on Junel to help prevent these. She is using the Florafemme and has not had a UTI since her visit in Oct 2021.\par \par She re-presented in 2022 with BL SI joint pain R>L. Her cardiologist told her her urine was not right. She did not have fever or dysuria but did have slightly more frequency and urgency but was drinking a ton of water. She was very anxious. She was convinced she had a UTI even though her urine was largely normal and positive only for hematuria. (Her culture was negative)\par \par Because she had microhematuria (int risk) we had her do a RBUS and on 2022 we did a cystoscopy that showed mild trigonitis.\par \par The patient's age today 07/10/2023 is 52 year old.\par Please note interval events and changes in PMH, PSH, MEDS and ALLERGIES were reviewed.\par She is a under a tremendous amount of stress because she may quit her job. \par She Is also stressed because her PCP's office told her she has hematuria.\par She otherwise feels well and has no gross hematuria.\par \par \par ===============================================================================\par \par PMH: Hypothyroid, HLD, small heart murmur, ? heart blockage, uterine polyp\par PSH: Polyp removal (uterine)\par POBH: (if applicable) \par FH: Father ALS; had MI in his 40's; mom has DMII\par \par ALL: Nutrasweet, Aspartame, Biaxin (n/v)\par MEDS: Keflex, Crestor, Synthroid, Florastor, Diflucan PRN, Vit D, Vit B12, Vit C, Zinc, Junel, Florafemme\par SOC:  Denies Tob, Social EtOH, Denies drugs\par \par \par ROS: Review of Systems is as per HPI unless otherwise denoted below\par \par \par ===============================================================================\par DATA: \par \par LABS:-------------------------------------------------------------------------------------------------------------------\par 10/26/2021: UA dip Trace BLD, Trace LE\par 2022: sCre 0.82, \par 2022: UA micro 1 WBC, Trace Ketones, Negative blood, Neg Nit, Trace LE, 13 RBC/hpf, 2 Epi, Few Bacteria\par 2022: UA dip small blood, Neg NIT, Neg LE\par 2022: UA negative NIT, LE, 3 RBC,  1 Epi, 1 WBC; UCx negative\par 3/28/2023: URine culture negative\par 2023: UA 1+ blood, 0-5 WBC, 0-2 WBC, 0-5 Epi, Neg NIT/Neg LE\par \par \par \par RADS:-------------------------------------------------------------------------------------------------------------------\par 2022: Normal RBUS\par \par \par PATHOLOGY/CYTOLOGY:-------------------------------------------------------------------------------------------\par \par \par \par VOIDING STUDIES: ----------------------------------------------------------------------------------------------------\par 10/26/2021: PVR 2cc\par \par \par STONE STUDIES: (Analysis/LLSA)----------------------------------------------------------------------------------\par \par \par \par PROCEDURES: -----------------------------------------------------------------------------------------------\par 2022: Cystoscopy\par Mild trigonitis otherwise normal\par \par \par \par ===============================================================================\par \par \par PHYSICAL EXAM:\par \par GEN: AAOx3, NAD, Habitus: normal\par \par BARRIERS to CARE: none\par \par PSYCH: Appropriate Behavior, Affect Congruent\par \par HEENT: AT/NC Trachea midline. EOMI.\par \par Lungs: No labored breathing.\par \par NEURO: + Movement, all 4 extremities grossly intact without deficits. No tremors.\par \par SKIN: Warm dry. No visible rashes or ulcers\par \par GAIT: Gait good, Stability normal\par \par \par =======================================================================================\par \par ASSESSMENT and PLAN\par \par Today 2022 the patient is a 52 year old female with a history of the following:\par \par \par \par 1. UTI that progressed into pyelonephritis:\par She is using florafemme and has been doing well.\par \par \par 2. SI joint pain with largely negative urine: she is anxious that she has a UTI\par She did NOT have an infection but did have 3-13 RBC/hpf. Her RBUS was normal. Her culture was normal.\par Her cystoscopy 2022 was normal except for mild trigonitis.\par Her UA by her PCP in 2023 was negative for blood. We will continue to monitor her and will check her urine next summer.\par \par 3. CBC\par She was super worried about her CBC that showed a HCT of 46 and RBC that was a bit low. \par I encouraged her to \par a. speak to her PCP about questions rather than worrying about results\par b. work on her stress levels. She seems to have significant stress that is going to have detrimental effects on her health\par \par She knows this, but seems to be in the pre-contemplation stage of change.\par \par -----------------------------------------------------------------------------------------------------\par LABS/TESTS Ordered:  Manage stress!\par Meds Ordered: \par Follow up: 2024 for UA\par -----------------------------------------------------------------------------------------------------\par \par \par The total amount of time I have personally spent preparing for this visit, reviewing the patient's test results, obtaining external history, ordering tests/medications, documenting clinical information, communicating with and counseling the patient/family and/or caregiver(s), and spent face to face with the patient explaining the above was  35 minutes.\par \par \par Thank you for allowing me to assist in the care of your patient. Should you have any questions please do not hesitate to reach out to me.\par \par \par Dinora Griffith MD\par Associate \par Department of Urology\par [facility]\par Phone: [phone]\par Fax: [phone]\par \par [address]\par [facility] [address]

## 2023-10-09 ENCOUNTER — NON-APPOINTMENT (OUTPATIENT)
Age: 53
End: 2023-10-09

## 2023-10-09 LAB — BACTERIA UR CULT: NORMAL

## 2024-11-21 NOTE — ASU PREOP CHECKLIST - ALLERGY BAND ON
The patient is seen for evaluation of hemoccult positive stool.    Occult blood was detected on   stool obtained during digital rectal exam  .    The patient   has not   seen visible blood in the stool.  The blood is described as    in color.   Associated symptoms include   no change in bowel habits  .      The patient takes   aspirin  .   There is a history of   .   Upper GI system review is notable for   .   The patient has previously undergone   COLONOSCOPY   for screening and/or diagnostic evaluation.
done

## 2025-02-18 ENCOUNTER — APPOINTMENT (OUTPATIENT)
Dept: ULTRASOUND IMAGING | Facility: CLINIC | Age: 55
End: 2025-02-18

## 2025-02-18 ENCOUNTER — APPOINTMENT (OUTPATIENT)
Dept: MAMMOGRAPHY | Facility: CLINIC | Age: 55
End: 2025-02-18
Payer: COMMERCIAL

## 2025-02-18 PROCEDURE — 77067 SCR MAMMO BI INCL CAD: CPT

## 2025-02-18 PROCEDURE — 77063 BREAST TOMOSYNTHESIS BI: CPT

## 2025-02-18 PROCEDURE — 76641 ULTRASOUND BREAST COMPLETE: CPT | Mod: 50

## 2025-04-02 NOTE — DISCHARGE NOTE NURSING/CASE MANAGEMENT/SOCIAL WORK - BRAND OF FIRST COVID-19 BOOSTER
Left Voicemail (1st Attempt) and Sent Mychart (1st Attempt) for the patient to call back and schedule the following:    Appointment type: NEW FOOT/ANKLE  Provider:   Return date: Next Available at [facility] [place] or [facility]  Specialty phone number: [phone]     [employer]

## (undated) DEVICE — ELCTR BALL LLETZ LG 5MM

## (undated) DEVICE — ELCTR LOOP FOR LLETZ 10MM

## (undated) DEVICE — ELCTR PLASMA LOOP MEDIUM 24FR 12-30 DEG

## (undated) DEVICE — GLV 8 PROTEXIS (WHITE)

## (undated) DEVICE — GLV 7.5 PROTEXIS (WHITE)

## (undated) DEVICE — ELCTR BOVIE PENCIL BLADE 10FT

## (undated) DEVICE — TUBING AQUILEX OUTFLOW

## (undated) DEVICE — ELCTR LOOP FOR LLETZ 15MM X 12MM

## (undated) DEVICE — TUBING STRYKER ARTHROSCOPY INFLOW

## (undated) DEVICE — WARMING BLANKET UPPER ADULT

## (undated) DEVICE — VENODYNE/SCD SLEEVE CALF MEDIUM

## (undated) DEVICE — PACK GYN WDC